# Patient Record
Sex: FEMALE | Race: WHITE | NOT HISPANIC OR LATINO | URBAN - METROPOLITAN AREA
[De-identification: names, ages, dates, MRNs, and addresses within clinical notes are randomized per-mention and may not be internally consistent; named-entity substitution may affect disease eponyms.]

---

## 2019-04-13 ENCOUNTER — LAB (OUTPATIENT)
Dept: LAB | Age: 69
End: 2019-04-13
Payer: MEDICARE

## 2019-04-13 ENCOUNTER — TRANSCRIBE ORDERS (OUTPATIENT)
Dept: ADMINISTRATIVE | Age: 69
End: 2019-04-13

## 2019-04-13 DIAGNOSIS — E55.9 VITAMIN D INSUFFICIENCY: Primary | ICD-10-CM

## 2019-04-13 DIAGNOSIS — E55.9 VITAMIN D INSUFFICIENCY: ICD-10-CM

## 2019-04-13 DIAGNOSIS — R53.83 FATIGUE, UNSPECIFIED TYPE: ICD-10-CM

## 2019-04-13 DIAGNOSIS — R53.83 FATIGUE, UNSPECIFIED TYPE: Primary | ICD-10-CM

## 2019-04-13 DIAGNOSIS — E78.5 HYPERLIPIDEMIA, UNSPECIFIED HYPERLIPIDEMIA TYPE: ICD-10-CM

## 2019-04-13 LAB
25(OH)D3 SERPL-MCNC: 61.6 NG/ML (ref 30–100)
ALBUMIN SERPL BCP-MCNC: 3.7 G/DL (ref 3.5–5)
ALP SERPL-CCNC: 65 U/L (ref 46–116)
ALT SERPL W P-5'-P-CCNC: 25 U/L (ref 12–78)
ANION GAP SERPL CALCULATED.3IONS-SCNC: 5 MMOL/L (ref 4–13)
AST SERPL W P-5'-P-CCNC: 17 U/L (ref 5–45)
BASOPHILS # BLD AUTO: 0.02 THOUSANDS/ΜL (ref 0–0.1)
BASOPHILS NFR BLD AUTO: 1 % (ref 0–1)
BILIRUB SERPL-MCNC: 0.49 MG/DL (ref 0.2–1)
BUN SERPL-MCNC: 23 MG/DL (ref 5–25)
CALCIUM SERPL-MCNC: 9.1 MG/DL (ref 8.3–10.1)
CHLORIDE SERPL-SCNC: 106 MMOL/L (ref 100–108)
CHOLEST SERPL-MCNC: 302 MG/DL (ref 50–200)
CO2 SERPL-SCNC: 31 MMOL/L (ref 21–32)
CREAT SERPL-MCNC: 0.93 MG/DL (ref 0.6–1.3)
EOSINOPHIL # BLD AUTO: 0.05 THOUSAND/ΜL (ref 0–0.61)
EOSINOPHIL NFR BLD AUTO: 2 % (ref 0–6)
ERYTHROCYTE [DISTWIDTH] IN BLOOD BY AUTOMATED COUNT: 13.1 % (ref 11.6–15.1)
GFR SERPL CREATININE-BSD FRML MDRD: 64 ML/MIN/1.73SQ M
GLUCOSE P FAST SERPL-MCNC: 90 MG/DL (ref 65–99)
HCT VFR BLD AUTO: 41.2 % (ref 34.8–46.1)
HDLC SERPL-MCNC: 78 MG/DL (ref 40–60)
HGB BLD-MCNC: 13.3 G/DL (ref 11.5–15.4)
IMM GRANULOCYTES # BLD AUTO: 0 THOUSAND/UL (ref 0–0.2)
IMM GRANULOCYTES NFR BLD AUTO: 0 % (ref 0–2)
LDLC SERPL CALC-MCNC: 210 MG/DL (ref 0–100)
LYMPHOCYTES # BLD AUTO: 1.22 THOUSANDS/ΜL (ref 0.6–4.47)
LYMPHOCYTES NFR BLD AUTO: 39 % (ref 14–44)
MCH RBC QN AUTO: 30 PG (ref 26.8–34.3)
MCHC RBC AUTO-ENTMCNC: 32.3 G/DL (ref 31.4–37.4)
MCV RBC AUTO: 93 FL (ref 82–98)
MONOCYTES # BLD AUTO: 0.27 THOUSAND/ΜL (ref 0.17–1.22)
MONOCYTES NFR BLD AUTO: 9 % (ref 4–12)
NEUTROPHILS # BLD AUTO: 1.61 THOUSANDS/ΜL (ref 1.85–7.62)
NEUTS SEG NFR BLD AUTO: 49 % (ref 43–75)
NONHDLC SERPL-MCNC: 224 MG/DL
NRBC BLD AUTO-RTO: 0 /100 WBCS
PLATELET # BLD AUTO: 272 THOUSANDS/UL (ref 149–390)
PMV BLD AUTO: 10.5 FL (ref 8.9–12.7)
POTASSIUM SERPL-SCNC: 4.1 MMOL/L (ref 3.5–5.3)
PROT SERPL-MCNC: 7.2 G/DL (ref 6.4–8.2)
RBC # BLD AUTO: 4.43 MILLION/UL (ref 3.81–5.12)
SODIUM SERPL-SCNC: 142 MMOL/L (ref 136–145)
TRIGL SERPL-MCNC: 72 MG/DL
TSH SERPL DL<=0.05 MIU/L-ACNC: 2.09 UIU/ML (ref 0.36–3.74)
WBC # BLD AUTO: 3.17 THOUSAND/UL (ref 4.31–10.16)

## 2019-04-13 PROCEDURE — 82306 VITAMIN D 25 HYDROXY: CPT

## 2019-04-13 PROCEDURE — 80053 COMPREHEN METABOLIC PANEL: CPT

## 2019-04-13 PROCEDURE — 84443 ASSAY THYROID STIM HORMONE: CPT

## 2019-04-13 PROCEDURE — 80061 LIPID PANEL: CPT

## 2019-04-13 PROCEDURE — 85025 COMPLETE CBC W/AUTO DIFF WBC: CPT

## 2019-04-13 PROCEDURE — 36415 COLL VENOUS BLD VENIPUNCTURE: CPT

## 2019-09-28 ENCOUNTER — TRANSCRIBE ORDERS (OUTPATIENT)
Dept: ADMINISTRATIVE | Age: 69
End: 2019-09-28

## 2019-09-28 ENCOUNTER — APPOINTMENT (OUTPATIENT)
Dept: LAB | Age: 69
End: 2019-09-28
Payer: MEDICARE

## 2019-09-28 DIAGNOSIS — E78.5 HYPERLIPIDEMIA, UNSPECIFIED HYPERLIPIDEMIA TYPE: Primary | ICD-10-CM

## 2019-09-28 DIAGNOSIS — E78.5 HYPERLIPIDEMIA, UNSPECIFIED HYPERLIPIDEMIA TYPE: ICD-10-CM

## 2019-09-28 LAB
ALBUMIN SERPL BCP-MCNC: 3.7 G/DL (ref 3.5–5)
ALP SERPL-CCNC: 62 U/L (ref 46–116)
ALT SERPL W P-5'-P-CCNC: 19 U/L (ref 12–78)
ANION GAP SERPL CALCULATED.3IONS-SCNC: 5 MMOL/L (ref 4–13)
AST SERPL W P-5'-P-CCNC: 16 U/L (ref 5–45)
BILIRUB SERPL-MCNC: 0.47 MG/DL (ref 0.2–1)
BUN SERPL-MCNC: 21 MG/DL (ref 5–25)
CALCIUM SERPL-MCNC: 9.4 MG/DL (ref 8.3–10.1)
CHLORIDE SERPL-SCNC: 107 MMOL/L (ref 100–108)
CO2 SERPL-SCNC: 29 MMOL/L (ref 21–32)
CREAT SERPL-MCNC: 0.99 MG/DL (ref 0.6–1.3)
GFR SERPL CREATININE-BSD FRML MDRD: 59 ML/MIN/1.73SQ M
GLUCOSE P FAST SERPL-MCNC: 94 MG/DL (ref 65–99)
LDLC SERPL DIRECT ASSAY-MCNC: 118 MG/DL (ref 0–100)
POTASSIUM SERPL-SCNC: 3.6 MMOL/L (ref 3.5–5.3)
PROT SERPL-MCNC: 7.1 G/DL (ref 6.4–8.2)
SODIUM SERPL-SCNC: 141 MMOL/L (ref 136–145)

## 2019-09-28 PROCEDURE — 80053 COMPREHEN METABOLIC PANEL: CPT

## 2019-09-28 PROCEDURE — 83721 ASSAY OF BLOOD LIPOPROTEIN: CPT

## 2019-09-28 PROCEDURE — 36415 COLL VENOUS BLD VENIPUNCTURE: CPT

## 2020-09-22 ENCOUNTER — TRANSCRIBE ORDERS (OUTPATIENT)
Dept: LAB | Facility: CLINIC | Age: 70
End: 2020-09-22

## 2020-09-22 DIAGNOSIS — Z03.818 ENCNTR FOR OBS FOR SUSP EXPSR TO OTH BIOLG AGENTS RULED OUT: Primary | ICD-10-CM

## 2020-09-22 DIAGNOSIS — Z71.84 COUNSELING FOR TRAVEL: ICD-10-CM

## 2020-09-25 DIAGNOSIS — Z03.818 ENCNTR FOR OBS FOR SUSP EXPSR TO OTH BIOLG AGENTS RULED OUT: ICD-10-CM

## 2020-09-25 DIAGNOSIS — Z71.84 COUNSELING FOR TRAVEL: ICD-10-CM

## 2020-09-25 PROCEDURE — U0003 INFECTIOUS AGENT DETECTION BY NUCLEIC ACID (DNA OR RNA); SEVERE ACUTE RESPIRATORY SYNDROME CORONAVIRUS 2 (SARS-COV-2) (CORONAVIRUS DISEASE [COVID-19]), AMPLIFIED PROBE TECHNIQUE, MAKING USE OF HIGH THROUGHPUT TECHNOLOGIES AS DESCRIBED BY CMS-2020-01-R: HCPCS | Performed by: INTERNAL MEDICINE

## 2020-09-26 LAB — SARS-COV-2 RNA SPEC QL NAA+PROBE: NOT DETECTED

## 2020-10-16 ENCOUNTER — LAB REQUISITION (OUTPATIENT)
Dept: LAB | Facility: HOSPITAL | Age: 70
End: 2020-10-16
Payer: COMMERCIAL

## 2020-10-16 DIAGNOSIS — I10 ESSENTIAL (PRIMARY) HYPERTENSION: ICD-10-CM

## 2020-10-16 DIAGNOSIS — E78.5 HYPERLIPIDEMIA, UNSPECIFIED: ICD-10-CM

## 2020-10-16 LAB
ALBUMIN SERPL BCP-MCNC: 4.5 G/DL (ref 3.5–5)
ALP SERPL-CCNC: 82 U/L (ref 46–116)
ALT SERPL W P-5'-P-CCNC: 28 U/L (ref 12–78)
ANION GAP SERPL CALCULATED.3IONS-SCNC: 6 MMOL/L (ref 4–13)
AST SERPL W P-5'-P-CCNC: 25 U/L (ref 5–45)
BILIRUB SERPL-MCNC: 0.33 MG/DL (ref 0.2–1)
BUN SERPL-MCNC: 23 MG/DL (ref 5–25)
CALCIUM SERPL-MCNC: 10.3 MG/DL (ref 8.3–10.1)
CHLORIDE SERPL-SCNC: 102 MMOL/L (ref 100–108)
CHOLEST SERPL-MCNC: 249 MG/DL (ref 50–200)
CO2 SERPL-SCNC: 33 MMOL/L (ref 21–32)
CREAT SERPL-MCNC: 0.91 MG/DL (ref 0.6–1.3)
GFR SERPL CREATININE-BSD FRML MDRD: 65 ML/MIN/1.73SQ M
GLUCOSE SERPL-MCNC: 75 MG/DL (ref 65–140)
HDLC SERPL-MCNC: 95 MG/DL
LDLC SERPL CALC-MCNC: 130 MG/DL (ref 0–100)
NONHDLC SERPL-MCNC: 154 MG/DL
POTASSIUM SERPL-SCNC: 4.2 MMOL/L (ref 3.5–5.3)
PROT SERPL-MCNC: 7.8 G/DL (ref 6.4–8.2)
SODIUM SERPL-SCNC: 141 MMOL/L (ref 136–145)
TRIGL SERPL-MCNC: 118 MG/DL

## 2020-10-16 PROCEDURE — 80061 LIPID PANEL: CPT | Performed by: INTERNAL MEDICINE

## 2020-10-16 PROCEDURE — 80053 COMPREHEN METABOLIC PANEL: CPT | Performed by: INTERNAL MEDICINE

## 2021-03-10 DIAGNOSIS — Z23 ENCOUNTER FOR IMMUNIZATION: ICD-10-CM

## 2021-08-19 ENCOUNTER — PROBLEM (OUTPATIENT)
Dept: URBAN - METROPOLITAN AREA CLINIC 6 | Facility: CLINIC | Age: 71
End: 2021-08-19

## 2021-08-19 DIAGNOSIS — H18.221: ICD-10-CM

## 2021-08-19 PROCEDURE — 92012 INTRM OPH EXAM EST PATIENT: CPT

## 2021-08-19 PROCEDURE — 76514 ECHO EXAM OF EYE THICKNESS: CPT

## 2021-08-19 ASSESSMENT — PACHYMETRY
OD_CT_UM: 722
OS_CT_UM: 592

## 2021-08-19 ASSESSMENT — VISUAL ACUITY
OS_CC: 20/30
OD_CC: 20/100

## 2021-09-30 ENCOUNTER — ESTABLISHED COMPREHENSIVE EXAM (OUTPATIENT)
Dept: URBAN - METROPOLITAN AREA CLINIC 6 | Facility: CLINIC | Age: 71
End: 2021-09-30

## 2021-09-30 DIAGNOSIS — H18.221: ICD-10-CM

## 2021-09-30 PROCEDURE — 92012 INTRM OPH EXAM EST PATIENT: CPT

## 2021-09-30 PROCEDURE — 76514 ECHO EXAM OF EYE THICKNESS: CPT

## 2021-09-30 PROCEDURE — G8428 CUR MEDS NOT DOCUMENT: HCPCS

## 2021-09-30 ASSESSMENT — VISUAL ACUITY
OD_CC: 20/70+1
OS_CC: 20/25+2

## 2021-09-30 ASSESSMENT — PACHYMETRY
OD_CT_UM: 621
OS_CT_UM: 604

## 2021-09-30 ASSESSMENT — TONOMETRY
OS_IOP_MMHG: 22
OD_IOP_MMHG: 24

## 2021-10-20 ENCOUNTER — LAB REQUISITION (OUTPATIENT)
Dept: LAB | Facility: HOSPITAL | Age: 71
End: 2021-10-20
Payer: COMMERCIAL

## 2021-10-20 DIAGNOSIS — R07.0 PAIN IN THROAT: ICD-10-CM

## 2021-10-20 PROCEDURE — 87635 SARS-COV-2 COVID-19 AMP PRB: CPT | Performed by: INTERNAL MEDICINE

## 2021-10-22 LAB — SARS-COV-2 RNA RESP QL NAA+PROBE: NEGATIVE

## 2021-11-16 ENCOUNTER — FOLLOW UP (OUTPATIENT)
Dept: URBAN - METROPOLITAN AREA CLINIC 6 | Facility: CLINIC | Age: 71
End: 2021-11-16

## 2021-11-16 DIAGNOSIS — H18.221: ICD-10-CM

## 2021-11-16 PROCEDURE — 76514 ECHO EXAM OF EYE THICKNESS: CPT

## 2021-11-16 PROCEDURE — G8427 DOCREV CUR MEDS BY ELIG CLIN: HCPCS

## 2021-11-16 PROCEDURE — 92012 INTRM OPH EXAM EST PATIENT: CPT

## 2021-11-16 ASSESSMENT — TONOMETRY
OD_IOP_MMHG: 42
OS_IOP_MMHG: 21

## 2021-11-16 ASSESSMENT — PACHYMETRY
OS_CT_UM: 605
OD_CT_UM: 618

## 2021-11-16 ASSESSMENT — VISUAL ACUITY
OD_CC: 20/40
OS_CC: 20/30

## 2021-12-28 ENCOUNTER — FOLLOW UP (OUTPATIENT)
Dept: URBAN - METROPOLITAN AREA CLINIC 6 | Facility: CLINIC | Age: 71
End: 2021-12-28

## 2021-12-28 DIAGNOSIS — H04.123: ICD-10-CM

## 2021-12-28 DIAGNOSIS — H18.221: ICD-10-CM

## 2021-12-28 PROCEDURE — 92132 CPTRZD OPH DX IMG ANT SGM: CPT

## 2021-12-28 PROCEDURE — G8427 DOCREV CUR MEDS BY ELIG CLIN: HCPCS

## 2021-12-28 PROCEDURE — 92012 INTRM OPH EXAM EST PATIENT: CPT

## 2021-12-28 ASSESSMENT — TONOMETRY
OS_IOP_MMHG: 15
OD_IOP_MMHG: 19

## 2021-12-28 ASSESSMENT — VISUAL ACUITY
OD_CC: 20/40
OS_CC: 20/30

## 2022-02-28 ENCOUNTER — FOLLOW UP (OUTPATIENT)
Dept: URBAN - METROPOLITAN AREA CLINIC 6 | Facility: CLINIC | Age: 72
End: 2022-02-28

## 2022-02-28 DIAGNOSIS — H18.221: ICD-10-CM

## 2022-02-28 DIAGNOSIS — H04.123: ICD-10-CM

## 2022-02-28 PROCEDURE — 76514 ECHO EXAM OF EYE THICKNESS: CPT

## 2022-02-28 PROCEDURE — 92012 INTRM OPH EXAM EST PATIENT: CPT

## 2022-02-28 ASSESSMENT — VISUAL ACUITY
OD_CC: 20/30-1
OU_SC: J1
OS_CC: 20/30

## 2022-02-28 ASSESSMENT — PACHYMETRY
OD_CT_UM: 585
OS_CT_UM: 594

## 2022-02-28 ASSESSMENT — TONOMETRY
OS_IOP_MMHG: 15
OD_IOP_MMHG: 15

## 2022-04-13 ENCOUNTER — APPOINTMENT (OUTPATIENT)
Dept: LAB | Age: 72
End: 2022-04-13
Payer: COMMERCIAL

## 2022-04-13 DIAGNOSIS — I10 ESSENTIAL HYPERTENSION, MALIGNANT: ICD-10-CM

## 2022-04-13 DIAGNOSIS — E55.9 AVITAMINOSIS D: ICD-10-CM

## 2022-04-13 DIAGNOSIS — E78.5 HYPERLIPIDEMIA, UNSPECIFIED HYPERLIPIDEMIA TYPE: ICD-10-CM

## 2022-04-13 LAB
25(OH)D3 SERPL-MCNC: 60.8 NG/ML (ref 30–100)
ALBUMIN SERPL BCP-MCNC: 3.9 G/DL (ref 3.5–5)
ALP SERPL-CCNC: 59 U/L (ref 46–116)
ALT SERPL W P-5'-P-CCNC: 21 U/L (ref 12–78)
ANION GAP SERPL CALCULATED.3IONS-SCNC: 3 MMOL/L (ref 4–13)
AST SERPL W P-5'-P-CCNC: 22 U/L (ref 5–45)
BASOPHILS # BLD AUTO: 0.03 THOUSANDS/ΜL (ref 0–0.1)
BASOPHILS NFR BLD AUTO: 1 % (ref 0–1)
BILIRUB SERPL-MCNC: 0.53 MG/DL (ref 0.2–1)
BUN SERPL-MCNC: 22 MG/DL (ref 5–25)
CALCIUM SERPL-MCNC: 9.6 MG/DL (ref 8.3–10.1)
CHLORIDE SERPL-SCNC: 106 MMOL/L (ref 100–108)
CHOLEST SERPL-MCNC: 220 MG/DL
CO2 SERPL-SCNC: 33 MMOL/L (ref 21–32)
CREAT SERPL-MCNC: 1.17 MG/DL (ref 0.6–1.3)
EOSINOPHIL # BLD AUTO: 0.08 THOUSAND/ΜL (ref 0–0.61)
EOSINOPHIL NFR BLD AUTO: 2 % (ref 0–6)
ERYTHROCYTE [DISTWIDTH] IN BLOOD BY AUTOMATED COUNT: 13 % (ref 11.6–15.1)
GFR SERPL CREATININE-BSD FRML MDRD: 47 ML/MIN/1.73SQ M
GLUCOSE P FAST SERPL-MCNC: 86 MG/DL (ref 65–99)
HCT VFR BLD AUTO: 42 % (ref 34.8–46.1)
HDLC SERPL-MCNC: 79 MG/DL
HGB BLD-MCNC: 13.8 G/DL (ref 11.5–15.4)
IMM GRANULOCYTES # BLD AUTO: 0.01 THOUSAND/UL (ref 0–0.2)
IMM GRANULOCYTES NFR BLD AUTO: 0 % (ref 0–2)
LDLC SERPL CALC-MCNC: 126 MG/DL (ref 0–100)
LYMPHOCYTES # BLD AUTO: 1.48 THOUSANDS/ΜL (ref 0.6–4.47)
LYMPHOCYTES NFR BLD AUTO: 39 % (ref 14–44)
MCH RBC QN AUTO: 30.9 PG (ref 26.8–34.3)
MCHC RBC AUTO-ENTMCNC: 32.9 G/DL (ref 31.4–37.4)
MCV RBC AUTO: 94 FL (ref 82–98)
MONOCYTES # BLD AUTO: 0.35 THOUSAND/ΜL (ref 0.17–1.22)
MONOCYTES NFR BLD AUTO: 9 % (ref 4–12)
NEUTROPHILS # BLD AUTO: 1.82 THOUSANDS/ΜL (ref 1.85–7.62)
NEUTS SEG NFR BLD AUTO: 49 % (ref 43–75)
NONHDLC SERPL-MCNC: 141 MG/DL
NRBC BLD AUTO-RTO: 0 /100 WBCS
PLATELET # BLD AUTO: 238 THOUSANDS/UL (ref 149–390)
PMV BLD AUTO: 10.4 FL (ref 8.9–12.7)
POTASSIUM SERPL-SCNC: 3.7 MMOL/L (ref 3.5–5.3)
PROT SERPL-MCNC: 7.1 G/DL (ref 6.4–8.2)
RBC # BLD AUTO: 4.47 MILLION/UL (ref 3.81–5.12)
SODIUM SERPL-SCNC: 142 MMOL/L (ref 136–145)
TRIGL SERPL-MCNC: 75 MG/DL
WBC # BLD AUTO: 3.77 THOUSAND/UL (ref 4.31–10.16)

## 2022-04-13 PROCEDURE — 80061 LIPID PANEL: CPT

## 2022-04-13 PROCEDURE — 80053 COMPREHEN METABOLIC PANEL: CPT

## 2022-04-13 PROCEDURE — 85025 COMPLETE CBC W/AUTO DIFF WBC: CPT

## 2022-04-13 PROCEDURE — 36415 COLL VENOUS BLD VENIPUNCTURE: CPT

## 2022-04-13 PROCEDURE — 82306 VITAMIN D 25 HYDROXY: CPT

## 2022-11-02 ENCOUNTER — FOLLOW UP (OUTPATIENT)
Dept: URBAN - METROPOLITAN AREA CLINIC 6 | Facility: CLINIC | Age: 72
End: 2022-11-02

## 2022-11-02 DIAGNOSIS — H04.123: ICD-10-CM

## 2022-11-02 DIAGNOSIS — H35.361: ICD-10-CM

## 2022-11-02 DIAGNOSIS — H25.813: ICD-10-CM

## 2022-11-02 PROCEDURE — 92015 DETERMINE REFRACTIVE STATE: CPT

## 2022-11-02 PROCEDURE — 92014 COMPRE OPH EXAM EST PT 1/>: CPT

## 2022-11-02 ASSESSMENT — TONOMETRY
OD_IOP_MMHG: 15
OS_IOP_MMHG: 15

## 2022-11-02 ASSESSMENT — VISUAL ACUITY
OS_PH: 20/40
OS_CC: 20/50
OS_CC: J1
OD_CC: J2
OS_GLARE: 20/70
OD_GLARE: 20/80
OD_CC: 20/30-1

## 2022-11-29 ENCOUNTER — HOSPITAL ENCOUNTER (OUTPATIENT)
Dept: RADIOLOGY | Age: 72
Discharge: HOME | End: 2022-11-29

## 2022-11-29 VITALS — HEIGHT: 66 IN | BODY MASS INDEX: 23.3 KG/M2 | WEIGHT: 145 LBS

## 2022-11-29 DIAGNOSIS — Z12.31 ENCOUNTER FOR SCREENING MAMMOGRAM FOR MALIGNANT NEOPLASM OF BREAST: ICD-10-CM

## 2023-02-07 ENCOUNTER — APPOINTMENT (OUTPATIENT)
Dept: RADIOLOGY | Age: 73
End: 2023-02-07

## 2023-02-07 DIAGNOSIS — J18.9 UNRESOLVED PNEUMONIA: ICD-10-CM

## 2023-04-22 ENCOUNTER — APPOINTMENT (OUTPATIENT)
Dept: LAB | Age: 73
End: 2023-04-22

## 2023-04-22 DIAGNOSIS — E78.5 HYPERLIPIDEMIA, UNSPECIFIED HYPERLIPIDEMIA TYPE: ICD-10-CM

## 2023-04-22 LAB — LDLC SERPL DIRECT ASSAY-MCNC: 131 MG/DL (ref 0–100)

## 2023-10-23 ENCOUNTER — APPOINTMENT (OUTPATIENT)
Dept: LAB | Age: 73
End: 2023-10-23
Payer: COMMERCIAL

## 2023-10-23 DIAGNOSIS — E78.49 OTHER HYPERLIPIDEMIA: ICD-10-CM

## 2023-10-23 LAB
ALBUMIN SERPL BCP-MCNC: 4.3 G/DL (ref 3.5–5)
ALP SERPL-CCNC: 61 U/L (ref 34–104)
ALT SERPL W P-5'-P-CCNC: 18 U/L (ref 7–52)
ANION GAP SERPL CALCULATED.3IONS-SCNC: 5 MMOL/L
AST SERPL W P-5'-P-CCNC: 24 U/L (ref 13–39)
BILIRUB SERPL-MCNC: 0.43 MG/DL (ref 0.2–1)
BUN SERPL-MCNC: 18 MG/DL (ref 5–25)
CALCIUM SERPL-MCNC: 9.4 MG/DL (ref 8.4–10.2)
CHLORIDE SERPL-SCNC: 103 MMOL/L (ref 96–108)
CO2 SERPL-SCNC: 34 MMOL/L (ref 21–32)
CREAT SERPL-MCNC: 1.02 MG/DL (ref 0.6–1.3)
GFR SERPL CREATININE-BSD FRML MDRD: 55 ML/MIN/1.73SQ M
GLUCOSE P FAST SERPL-MCNC: 99 MG/DL (ref 65–99)
LDLC SERPL DIRECT ASSAY-MCNC: 80 MG/DL (ref 0–100)
POTASSIUM SERPL-SCNC: 3.8 MMOL/L (ref 3.5–5.3)
PROT SERPL-MCNC: 7.2 G/DL (ref 6.4–8.4)
SODIUM SERPL-SCNC: 142 MMOL/L (ref 135–147)

## 2023-10-23 PROCEDURE — 80053 COMPREHEN METABOLIC PANEL: CPT

## 2023-10-23 PROCEDURE — 83721 ASSAY OF BLOOD LIPOPROTEIN: CPT

## 2023-10-23 PROCEDURE — 36415 COLL VENOUS BLD VENIPUNCTURE: CPT

## 2023-10-30 ENCOUNTER — HOSPITAL ENCOUNTER (OUTPATIENT)
Dept: RADIOLOGY | Facility: HOSPITAL | Age: 73
Discharge: HOME | End: 2023-10-30
Payer: COMMERCIAL

## 2023-10-30 DIAGNOSIS — G89.29 OTHER CHRONIC PAIN: ICD-10-CM

## 2023-10-30 PROCEDURE — 73030 X-RAY EXAM OF SHOULDER: CPT

## 2023-11-13 ENCOUNTER — OFFICE VISIT (OUTPATIENT)
Dept: OBGYN CLINIC | Facility: OTHER | Age: 73
End: 2023-11-13
Payer: COMMERCIAL

## 2023-11-13 VITALS
SYSTOLIC BLOOD PRESSURE: 130 MMHG | BODY MASS INDEX: 24.59 KG/M2 | HEART RATE: 75 BPM | DIASTOLIC BLOOD PRESSURE: 77 MMHG | HEIGHT: 66 IN | WEIGHT: 153 LBS

## 2023-11-13 DIAGNOSIS — M12.811 ROTATOR CUFF TEAR ARTHROPATHY OF RIGHT SHOULDER: Primary | ICD-10-CM

## 2023-11-13 DIAGNOSIS — M75.101 ROTATOR CUFF TEAR ARTHROPATHY OF RIGHT SHOULDER: Primary | ICD-10-CM

## 2023-11-13 PROCEDURE — 20610 DRAIN/INJ JOINT/BURSA W/O US: CPT | Performed by: ORTHOPAEDIC SURGERY

## 2023-11-13 PROCEDURE — 99204 OFFICE O/P NEW MOD 45 MIN: CPT | Performed by: ORTHOPAEDIC SURGERY

## 2023-11-13 RX ORDER — CHLORTHALIDONE 25 MG/1
TABLET ORAL
COMMUNITY

## 2023-11-13 RX ORDER — LOVASTATIN 20 MG/1
TABLET ORAL
COMMUNITY

## 2023-11-13 RX ORDER — BETAMETHASONE SODIUM PHOSPHATE AND BETAMETHASONE ACETATE 3; 3 MG/ML; MG/ML
6 INJECTION, SUSPENSION INTRA-ARTICULAR; INTRALESIONAL; INTRAMUSCULAR; SOFT TISSUE
Status: COMPLETED | OUTPATIENT
Start: 2023-11-13 | End: 2023-11-13

## 2023-11-13 RX ORDER — FLUTICASONE PROPIONATE 50 MCG
SPRAY, SUSPENSION (ML) NASAL
COMMUNITY
Start: 2011-10-31

## 2023-11-13 RX ORDER — BUPIVACAINE HYDROCHLORIDE 2.5 MG/ML
2 INJECTION, SOLUTION INFILTRATION; PERINEURAL
Status: COMPLETED | OUTPATIENT
Start: 2023-11-13 | End: 2023-11-13

## 2023-11-13 RX ORDER — EZETIMIBE 10 MG/1
TABLET ORAL
COMMUNITY
Start: 2023-04-27

## 2023-11-13 RX ADMIN — BETAMETHASONE SODIUM PHOSPHATE AND BETAMETHASONE ACETATE 6 MG: 3; 3 INJECTION, SUSPENSION INTRA-ARTICULAR; INTRALESIONAL; INTRAMUSCULAR; SOFT TISSUE at 14:15

## 2023-11-13 RX ADMIN — BUPIVACAINE HYDROCHLORIDE 2 ML: 2.5 INJECTION, SOLUTION INFILTRATION; PERINEURAL at 14:15

## 2023-11-13 NOTE — PROGRESS NOTES
Assessment  Diagnoses and all orders for this visit:    Rotator cuff tear arthropathy of right shoulder      Discussion and Plan:    Explained to the patient that her x rays and examination are consistent with rotator cuff tear arthropathy (glenohumeral osteoarthritis with rotator cuff deficiency). The pathoanatomy and natural history of this diagnosis was explained to the patient today in the office. She understood and all questions were answered. She was provided with a cortisone injection today in the office. This is documented appropriately below. We can repeat these injections once every 4-6 months as needed  A referral to formal physical therapy/HEP was also provided for the patient today  If symptoms persist then a surgical procedure in the form of a reverse total shoulder arthroplasty would be warranted  She will follow up on an as needed basis    Subjective:   Patient ID: Princess Booker is a 68 y.o. female    The patient presents with a chief complaint of right shoulder pain. The pain began a few year(s) ago and is not associated with an acute injury. The patient describes the pain as aching and dull in intensity,  intermittent, occurring with increasing frequency in timing, and localizes the pain to the  right subacromial joint, deltoid. The pain is worse with overhead work, overuse, and raising arm over head and relieved by rest, ice, avoiding the painful activities. The pain is not associated with numbness and tingling. The pain is not associated with constitutional symptoms. The patient is awoken at night by the pain. The patient has had no prior treatment.         The following portions of the patient's history were reviewed and updated as appropriate: allergies, current medications, past family history, past medical history, past social history, past surgical history and problem list.    Objective:  /77 (BP Location: Left arm, Patient Position: Sitting, Cuff Size: Standard) Pulse 75   Ht 5' 6" (1.676 m)   Wt 69.4 kg (153 lb)   BMI 24.69 kg/m²       Right Shoulder Exam     Range of Motion   The patient has normal right shoulder ROM. Muscle Strength   Abduction: 4/5   External rotation: 4/5     Other   Erythema: absent  Sensation: normal  Pulse: present            Physical Exam  Constitutional:       Appearance: She is well-developed. Eyes:      Pupils: Pupils are equal, round, and reactive to light. Pulmonary:      Effort: Pulmonary effort is normal.      Breath sounds: Normal breath sounds. Skin:     General: Skin is warm and dry. Neurological:      Mental Status: She is alert and oriented to person, place, and time. Psychiatric:         Behavior: Behavior normal.         Thought Content: Thought content normal.         Judgment: Judgment normal.       Large joint arthrocentesis: R subacromial bursa  Universal Protocol:  Consent: Verbal consent obtained. Risks and benefits: risks, benefits and alternatives were discussed  Consent given by: patient  Time out: Immediately prior to procedure a "time out" was called to verify the correct patient, procedure, equipment, support staff and site/side marked as required. Site marked: the operative site was marked  Radiology Images displayed and confirmed.  If images not available, report reviewed: imaging studies available  Patient identity confirmed: verbally with patient  Supporting Documentation  Indications: pain and diagnostic evaluation   Procedure Details  Location: shoulder - R subacromial bursa  Preparation: Patient was prepped and draped in the usual sterile fashion  Needle size: 22 G  Ultrasound guidance: no  Approach: lateral  Medications administered: 2 mL bupivacaine 0.25 %; 6 mg betamethasone acetate-betamethasone sodium phosphate 6 (3-3) mg/mL    Patient tolerance: patient tolerated the procedure well with no immediate complications  Dressing:  Sterile dressing applied        I have personally reviewed pertinent films in PACS and my interpretation is as follows. X Ray Right Shoulder 10/30/23: High riding humeral head in relation to the glenoid suggestive of a chronic rotator cuff tear with moderate glenohumeral joint osteoarthritis.  No other acute osseous abnormalities    Scribe Attestation      I,:  Yasmine Knapp am acting as a scribe while in the presence of the attending physician.:       I,:  Jacquelin Matthews MD personally performed the services described in this documentation    as scribed in my presence.:

## 2023-11-13 NOTE — PATIENT INSTRUCTIONS
CORTICOSTEROID INJECTION  What is a corticosteroid? Injuries or disease such as arthritis, bursitis or tendonitis result in inflammation. In turn, this inflammation can cause swelling and pain. A local injection of a corticosteroid is provided to diminish inflammation. By doing so, it will also decrease pain and swelling which is making you uncomfortable. Is this the same thing as a Cortisone Injection? Cortisone® is a brand name of a corticosteroid used commonly in the past.  Today I commonly use a more water-soluble corticosteroid named Celestone®. Will the injection hurt? As with any injection, you may feel pain at the time of the injection. Typically, I use a local anesthetic (Rasta Fabry) in addition to the corticosteroid to determine if the injection has been placed in the appropriate location. Hence it is important to monitor your symptoms 4-6 hours after the injection, as the area will be anesthetized (numb) while the local anesthetic is working. Once the local anesthetic wears off, the intensity of pain can be the same as it was prior to the injection, or even worse. This does not mean that the injection is not working. The corticosteroid may take 24-72 hours to begin having a positive effect. If you do experience an increase in pain, the use of an ice pack on the area for 20 minutes at a time should help. It is also helpful to take an oral anti-inflammatory such as Tylenol® or Motrin® if you are able to medically do so. For this reason it is best to avoid activities that put stress on the area the first 24 hours after the injection. How long will pain relief last?  This will vary according to the type and severity of the symptoms being treated and the severity of the condition. Symptom relief may last weeks to months. I typically couple injections with physical therapy so that the underlying problem causing the inflammation may be treated as the pain diminishes.   If the combination is not successful, you may be a surgical candidate. I have read bad things about steroids. Will these things happen to me? Corticosteroids, when utilized properly, are safe and effective drugs. When used in a low dose, potential adverse reactions are very rare. Some patients may experience a sensation of flushing for several days. Very rarely, there can be a local reaction which may include increased discomfort for a period of time in the areas that has been injected. A steroid should not be used over and over again. Multiple injections in the same area can produce adverse effects such as tissue atrophy and degeneration of tendon or cartilage. A small percentage of patients (< 0.1%) may develop an infection in the joint after injection. This is a treatable problem, but if neglected, may result in permanent disability. Signs of infection include redness, swelling, discharge, fevers, increasing pain and drainage from the injection site. This represents an emergency and you should contact our office immediately or seek treatment in the ER if after hours. If I have diabetes, will this injection affect me? If you are diabetic, an injection of a corticosteroid can raise your blood sugar level, requiring more insulin for a brief period of time. This may necessitate careful blood sugar maintenance. If the elevated sugars are not able to be controlled, contact your diabetic doctor for guidance.

## 2023-11-14 ENCOUNTER — ESTABLISHED COMPREHENSIVE EXAM (OUTPATIENT)
Dept: URBAN - METROPOLITAN AREA CLINIC 6 | Facility: CLINIC | Age: 73
End: 2023-11-14

## 2023-11-14 DIAGNOSIS — H04.123: ICD-10-CM

## 2023-11-14 DIAGNOSIS — H35.363: ICD-10-CM

## 2023-11-14 DIAGNOSIS — H25.813: ICD-10-CM

## 2023-11-14 DIAGNOSIS — H31.103: ICD-10-CM

## 2023-11-14 PROCEDURE — 92134 CPTRZ OPH DX IMG PST SGM RTA: CPT

## 2023-11-14 PROCEDURE — 92014 COMPRE OPH EXAM EST PT 1/>: CPT

## 2023-11-14 ASSESSMENT — VISUAL ACUITY
OD_CC: 20/40
OD_PH: 20/30-2
OS_CC: 20/40
OS_PH: 20/30-2

## 2023-11-14 ASSESSMENT — TONOMETRY
OS_IOP_MMHG: 14
OD_IOP_MMHG: 16

## 2023-11-27 ENCOUNTER — EVALUATION (OUTPATIENT)
Dept: PHYSICAL THERAPY | Age: 73
End: 2023-11-27
Payer: COMMERCIAL

## 2023-11-27 DIAGNOSIS — M12.811 ROTATOR CUFF TEAR ARTHROPATHY OF RIGHT SHOULDER: Primary | ICD-10-CM

## 2023-11-27 DIAGNOSIS — M75.101 ROTATOR CUFF TEAR ARTHROPATHY OF RIGHT SHOULDER: Primary | ICD-10-CM

## 2023-11-27 PROCEDURE — 97110 THERAPEUTIC EXERCISES: CPT | Performed by: PHYSICAL THERAPIST

## 2023-11-27 PROCEDURE — 97140 MANUAL THERAPY 1/> REGIONS: CPT | Performed by: PHYSICAL THERAPIST

## 2023-11-27 PROCEDURE — 97161 PT EVAL LOW COMPLEX 20 MIN: CPT | Performed by: PHYSICAL THERAPIST

## 2023-11-27 NOTE — PROGRESS NOTES
PT Evaluation     Today's date: 2023  Patient name: Juan Carlos Perez  : 1950  MRN: [id number]  Referring provider: Vero Graza*  Dx:   Encounter Diagnosis     ICD-10-CM    1. Rotator cuff tear arthropathy of right shoulder  M75.101 Ambulatory Referral to Physical Therapy    M12.811                      Assessment  Assessment details: Pt reports to PT with cc of R shoulder pain. Pt has symptoms consistent with rotator cuff pathology and would benefit from skilled PT in order to improve function with ADLs.    Impairments: abnormal or restricted ROM, abnormal movement, activity intolerance, impaired physical strength, lacks appropriate home exercise program and pain with function    Goals  In 4 weeks pt will:  -Be independent with phase I of HEP  -Increase UE strength by 1/2 grade  -Increase UE ROM by 10 degrees    By discharge pt will:  -Be independent with Phase II of HEP  -Demonstrate full UE strength  -Demonstrate full UE ROM  -Report minimal pain with ADLs    Plan  Patient would benefit from: skilled physical therapy  Planned therapy interventions: joint mobilization, neuromuscular re-education, manual therapy, patient education, therapeutic activities, therapeutic exercise, home exercise program and functional ROM exercises  Frequency: 2x week  Plan of Care beginning date: 2023  Plan of Care expiration date: 2024  Treatment plan discussed with: PTA and patient      Subjective    Objective     Active Range of Motion   Left Shoulder   Flexion: 170 degrees     Right Shoulder   Flexion: 160 degrees   External rotation 45°: 30 degrees with pain  Internal rotation 45°: WFL and with pain    Strength/Myotome Testing     Left Shoulder     Planes of Motion   Flexion: 4   External rotation at 45°: 3+   Internal rotation at 45°: 3+     Right Shoulder     Planes of Motion   Flexion: 4   External rotation at 45°: 3   Internal rotation at 45°: 3              Precautions: N/A      Manuals  PROM [facility]RIS            [facility] mob[facility] JR                                      Neuro Re-Ed                                                                                                        Ther Ex             Prone row/ext, S/L ER, pec stretch 25'                                                                                                       Ther Activity                                       Gait Training                                       Modalities

## 2023-12-05 ENCOUNTER — OFFICE VISIT (OUTPATIENT)
Dept: PHYSICAL THERAPY | Age: 73
End: 2023-12-05
Payer: COMMERCIAL

## 2023-12-05 DIAGNOSIS — M12.811 ROTATOR CUFF TEAR ARTHROPATHY OF RIGHT SHOULDER: Primary | ICD-10-CM

## 2023-12-05 DIAGNOSIS — M75.101 ROTATOR CUFF TEAR ARTHROPATHY OF RIGHT SHOULDER: Primary | ICD-10-CM

## 2023-12-05 PROCEDURE — 97110 THERAPEUTIC EXERCISES: CPT | Performed by: PHYSICAL THERAPIST

## 2023-12-05 NOTE — PROGRESS NOTES
Daily Note     Today's date: 2023  Patient name: Fly Steen  : 1950  MRN: [id number]  Referring provider: Liana Rosales*  Dx:   Encounter Diagnosis     ICD-10-CM    1. Rotator cuff tear arthropathy of right shoulder  M75.101     M12.811                      Subjective: Pt has some soreness at the moment       Objective: See treatment diary below      Assessment: Tolerated treatment well. Patient demonstrated fatigue post treatment, would benefit from continued PT, and pt will continue with HEP, with goal of increases reps before next visit. Capsular restrictions remain      Plan: Continue per plan of care. Progress treatment as tolerated.        Precautions: N/A      Manuals            PROM JR NORRIS           [facility] mobs JR NORRIS                                     Neuro Re-Ed                                                                                                        Ther Ex             Prone row/ext, S/L ER, pec stretch 25'            Prone row/ext  1# 2x15           S/L er  3x15                                                                            Ther Activity                                       Gait Training                                       Modalities

## 2023-12-12 ENCOUNTER — OFFICE VISIT (OUTPATIENT)
Dept: PHYSICAL THERAPY | Age: 73
End: 2023-12-12
Payer: COMMERCIAL

## 2023-12-12 ENCOUNTER — FOLLOW UP (OUTPATIENT)
Dept: URBAN - METROPOLITAN AREA CLINIC 6 | Facility: CLINIC | Age: 73
End: 2023-12-12

## 2023-12-12 DIAGNOSIS — H52.4: ICD-10-CM

## 2023-12-12 DIAGNOSIS — M12.811 ROTATOR CUFF TEAR ARTHROPATHY OF RIGHT SHOULDER: Primary | ICD-10-CM

## 2023-12-12 DIAGNOSIS — M75.101 ROTATOR CUFF TEAR ARTHROPATHY OF RIGHT SHOULDER: Primary | ICD-10-CM

## 2023-12-12 PROCEDURE — 97110 THERAPEUTIC EXERCISES: CPT | Performed by: PHYSICAL THERAPIST

## 2023-12-12 PROCEDURE — 92015 DETERMINE REFRACTIVE STATE: CPT

## 2023-12-12 PROCEDURE — 97140 MANUAL THERAPY 1/> REGIONS: CPT | Performed by: PHYSICAL THERAPIST

## 2023-12-12 ASSESSMENT — KERATOMETRY
OS_AXISANGLE2_DEGREES: 161
OS_K2POWER_DIOPTERS: 45.00
OS_AXISANGLE_DEGREES: 071
OD_K1POWER_DIOPTERS: 44.75
OD_AXISANGLE2_DEGREES: 90
OD_AXISANGLE_DEGREES: 180
OD_K2POWER_DIOPTERS: 44.75
OS_K1POWER_DIOPTERS: 44.75

## 2023-12-12 ASSESSMENT — VISUAL ACUITY
OS_CC: J1
OD_CC: 20/40-1
OS_PH: 20/40
OS_CC: 20/50+1
OD_CC: J1

## 2023-12-12 NOTE — PROGRESS NOTES
Daily Note     Today's date: 2023  Patient name: Jose Hicks  : 1950  MRN: [id number]  Referring provider: Asif Jacome*  Dx:   Encounter Diagnosis     ICD-10-CM    1. Rotator cuff tear arthropathy of right shoulder  M75.101     M12.811                      Subjective: Pt reports continued improvement       Objective: See treatment diary below      Assessment: Tolerated treatment well. Patient demonstrated fatigue post treatment, would benefit from continued PT, and pt continues to progress with strengthening and capsular mobility       Plan: Continue per plan of care. Progress treatment as tolerated.        Precautions: N/A      Manuals           PROM JR NORRIS          [facility] mobs JR NORRIS                                    Neuro Re-Ed                                                                                                                     Ther Ex             Prone row/ext, S/L ER, pec stretch 25'            Arm bike             Prone row/ext  1# 2x15 2# 3x15          S/L er  3x15 3x15 full ROM          SA punch   3x15 2#                                                              Ther Activity                                       Gait Training                                       Modalities

## 2023-12-19 ENCOUNTER — OFFICE VISIT (OUTPATIENT)
Dept: PHYSICAL THERAPY | Age: 73
End: 2023-12-19
Payer: COMMERCIAL

## 2023-12-19 DIAGNOSIS — M75.101 ROTATOR CUFF TEAR ARTHROPATHY OF RIGHT SHOULDER: Primary | ICD-10-CM

## 2023-12-19 DIAGNOSIS — M12.811 ROTATOR CUFF TEAR ARTHROPATHY OF RIGHT SHOULDER: Primary | ICD-10-CM

## 2023-12-19 PROCEDURE — 97110 THERAPEUTIC EXERCISES: CPT | Performed by: PHYSICAL THERAPIST

## 2023-12-19 NOTE — PROGRESS NOTES
Daily Note     Today's date: 2023  Patient name: Liana Dsouza  : 1950  MRN: [id number]  Referring provider: Brent Alvarado*  Dx:   Encounter Diagnosis     ICD-10-CM    1. Rotator cuff tear arthropathy of right shoulder  M75.101     M12.811                      Subjective: Pt has no complaints       Objective: See treatment diary below      Assessment: Tolerated treatment well. Patient demonstrated fatigue post treatment, would benefit from continued PT, and Pt was given updated HEP      Plan: Continue per plan of care.  Progress treatment as tolerated.       Precautions: N/A      Manuals          PROM [facility]         [facility] mobs [facility]                                   Neuro Re-Ed                                                                                                                     Ther Ex             Prone row/ext, S/L ER, pec stretch 25'            Arm bike            Prone row/ext  1# 2x15 2# 3x15 2# 3x15         S/L er  3x15 3x15 full ROM 1# 2x15         SA punch   3x15 2#          S/L flex    3x15         HEP    S/L flex, wall push up                                   Ther Activity                                       Gait Training                                       Modalities

## 2023-12-26 ENCOUNTER — OFFICE VISIT (OUTPATIENT)
Dept: PHYSICAL THERAPY | Age: 73
End: 2023-12-26
Payer: COMMERCIAL

## 2023-12-26 DIAGNOSIS — M12.811 ROTATOR CUFF TEAR ARTHROPATHY OF RIGHT SHOULDER: Primary | ICD-10-CM

## 2023-12-26 DIAGNOSIS — M75.101 ROTATOR CUFF TEAR ARTHROPATHY OF RIGHT SHOULDER: Primary | ICD-10-CM

## 2023-12-26 PROCEDURE — 97140 MANUAL THERAPY 1/> REGIONS: CPT | Performed by: PHYSICAL THERAPIST

## 2023-12-26 PROCEDURE — 97110 THERAPEUTIC EXERCISES: CPT | Performed by: PHYSICAL THERAPIST

## 2023-12-26 NOTE — PROGRESS NOTES
Daily Note     Today's date: 2023  Patient name: Liana Dsouza  : 1950  MRN: [id number]  Referring provider: Brent Alvarado*  Dx:   Encounter Diagnosis     ICD-10-CM    1. Rotator cuff tear arthropathy of right shoulder  M75.101     M12.811                      Subjective: No major changes       Objective: See treatment diary below      Assessment: Tolerated treatment well. Patient demonstrated fatigue post treatment, would benefit from continued PT, and pt remains limited in ER strength/ROM      Plan: Continue per plan of care.  Progress treatment as tolerated.       Precautions: N/A      Manuals         PROM [facility] JR NORRIS NORRIS        [facility] mobs [facility] NORRIS NORRIS                                  Neuro Re-Ed                                                                                                                     Ther Ex             Prone row/ext, S/L ER, pec stretch 25'            Arm bike           Prone row/ext  1# 2x15 2# 3x15 2# 3x15 2# 3x15        S/L er  3x15 3x15 full ROM 1# 2x15 1# 2x15        SA punch   3x15 2#  3x15 2#        S/L flex    3x15         HEP    S/L flex, wall push up               Give TB for row/ext                    Ther Activity                                       Gait Training                                       Modalities

## 2024-01-02 ENCOUNTER — OFFICE VISIT (OUTPATIENT)
Dept: PHYSICAL THERAPY | Age: 74
End: 2024-01-02
Payer: COMMERCIAL

## 2024-01-02 DIAGNOSIS — M12.811 ROTATOR CUFF TEAR ARTHROPATHY OF RIGHT SHOULDER: Primary | ICD-10-CM

## 2024-01-02 DIAGNOSIS — M75.101 ROTATOR CUFF TEAR ARTHROPATHY OF RIGHT SHOULDER: Primary | ICD-10-CM

## 2024-01-02 PROCEDURE — 97140 MANUAL THERAPY 1/> REGIONS: CPT | Performed by: SPECIALIST/TECHNOLOGIST

## 2024-01-02 PROCEDURE — 97110 THERAPEUTIC EXERCISES: CPT | Performed by: SPECIALIST/TECHNOLOGIST

## 2024-01-02 NOTE — PROGRESS NOTES
Daily Note     Today's date: 2024  Patient name: Liana Dsouza  : 1950  MRN: [id number]  Referring provider: Brnet Alvarado*  Dx:   Encounter Diagnosis     ICD-10-CM    1. Rotator cuff tear arthropathy of right shoulder  M75.101     M12.811                      Subjective: Pt reports she has not been performing HEP as frequently due to the holidays.       Objective: See treatment diary below      Assessment: Pt able to perform increased reps and resistance this visit without reproduction of R shoulder pain. Updated HEP with resistance bands. Improved pain free ROM noted following manual interventions. Tolerated treatment well. Patient demonstrated fatigue post treatment, exhibited good technique with therapeutic exercises, and would benefit from continued PT      Plan: Continue per plan of care.  Progress treatment as tolerated.       Precautions: N/A        Manuals        PROM JR NORRIS KM       [facility] mobs JR NORRIS                                  Neuro Re-Ed                                                                                                                     Ther Ex             Prone row/ext, S/L ER, pec stretch 25'            Arm bike          Prone row/ext  1# 2x15 2# 3x15 2# 3x15 2# 3x15 3#, 2# 3x15       S/L er  3x15 3x15 full ROM 1# 2x15 1# 2x15 2# 3x10       SA punch   3x15 2#  3x15 2# 3# 3x15       S/L flex    3x15  3x15        HEP    S/L flex, wall push up  Rows & Ext Green TB 3x10, IR 2x10                                 Ther Activity                                       Gait Training                                       Modalities

## 2024-01-09 ENCOUNTER — OFFICE VISIT (OUTPATIENT)
Dept: PHYSICAL THERAPY | Age: 74
End: 2024-01-09
Payer: COMMERCIAL

## 2024-01-09 DIAGNOSIS — M12.811 ROTATOR CUFF TEAR ARTHROPATHY OF RIGHT SHOULDER: Primary | ICD-10-CM

## 2024-01-09 DIAGNOSIS — M75.101 ROTATOR CUFF TEAR ARTHROPATHY OF RIGHT SHOULDER: Primary | ICD-10-CM

## 2024-01-09 PROCEDURE — 97110 THERAPEUTIC EXERCISES: CPT | Performed by: PHYSICAL THERAPIST

## 2024-01-09 PROCEDURE — 97140 MANUAL THERAPY 1/> REGIONS: CPT | Performed by: PHYSICAL THERAPIST

## 2024-01-09 NOTE — PROGRESS NOTES
Daily Note     Today's date: 2024  Patient name: Liana Dsouza  : 1950  MRN: [id number]  Referring provider: Brent Alvarado*  Dx:   Encounter Diagnosis     ICD-10-CM    1. Rotator cuff tear arthropathy of right shoulder  M75.101     M12.811                      Subjective: Pt reports continued improvement, has difficulty with lifting items onto shelves       Objective: See treatment diary below      Assessment: Tolerated treatment well. Patient demonstrated fatigue post treatment, would benefit from continued PT, and pt will add in std flex/scaption       Plan: Continue per plan of care.  Progress treatment as tolerated.       Precautions: N/A        Manuals       PROM [facility] NORRIS      [facility] mobs [facility]                                  Neuro Re-Ed                                                                                                                     Ther Ex             Prone row/ext, S/L ER, pec stretch 25'            Arm bike   5      Prone row/ext  1# 2x15 2# 3x15 2# 3x15 2# 3x15 3#, 2# 3x15 DC      S/L er  3x15 3x15 full ROM 1# 2x15 1# 2x15 2# 3x10       SA punch   3x15 2#  3x15 2# 3# 3x15       S/L flex    3x15  3x15        HEP    S/L flex, wall push up  Rows & Ext Green TB 3x10, IR 2x10       Std flex/abd       1# 2x10                    Ther Activity                                       Gait Training                                       Modalities

## 2024-01-16 ENCOUNTER — APPOINTMENT (OUTPATIENT)
Dept: PHYSICAL THERAPY | Age: 74
End: 2024-01-16
Payer: COMMERCIAL

## 2024-01-17 ENCOUNTER — OFFICE VISIT (OUTPATIENT)
Dept: PHYSICAL THERAPY | Age: 74
End: 2024-01-17
Payer: COMMERCIAL

## 2024-01-17 DIAGNOSIS — M75.101 ROTATOR CUFF TEAR ARTHROPATHY OF RIGHT SHOULDER: Primary | ICD-10-CM

## 2024-01-17 DIAGNOSIS — M12.811 ROTATOR CUFF TEAR ARTHROPATHY OF RIGHT SHOULDER: Primary | ICD-10-CM

## 2024-01-17 PROCEDURE — 97140 MANUAL THERAPY 1/> REGIONS: CPT | Performed by: SPECIALIST/TECHNOLOGIST

## 2024-01-17 PROCEDURE — 97110 THERAPEUTIC EXERCISES: CPT | Performed by: SPECIALIST/TECHNOLOGIST

## 2024-01-17 NOTE — PROGRESS NOTES
Daily Note     Today's date: 2024  Patient name: Liana Dsouza  : 1950  MRN: [id number]  Referring provider: Brent Alvarado*  Dx:   Encounter Diagnosis     ICD-10-CM    1. Rotator cuff tear arthropathy of right shoulder  M75.101     M12.811                      Subjective: Pt reports ongoing compliance with HEP and she has stopped taking OTC meds to manage shoulder pain.       Objective: See treatment diary below      Assessment: Pt able to increase resistance and reps with several therex without reproduction of R shoulder pain.  Tolerated treatment well. Patient demonstrated fatigue post treatment, exhibited good technique with therapeutic exercises, and would benefit from continued PT      Plan: Continue per plan of care.  Progress treatment as tolerated.       Precautions: N/A        Manuals      PROM [facility]     [facility] mobs [facility]                                  Neuro Re-Ed                                                                                                                     Ther Ex             Prone row/ext, S/L ER, pec stretch 25'            Arm bike   5 5     Prone row/ext  1# 2x15 2# 3x15 2# 3x15 2# 3x15 3#, 2# 3x15 DC      Prone Horiz Abd        2x10     S/L er  3x15 3x15 full ROM 1# 2x15 1# 2x15 2# 3x10 2# 3x10 2# 3x10     SA punch   3x15 2#  3x15 2# 3# 3x15 3# 3x15 3# 3x15     S/L flex    3x15  3x15   1# 3x10     HEP    S/L flex, wall push up  Rows & Ext Green TB 3x10, IR 2x10  Blue tB given     TB IR        Yellow 2x10     Std flex/abd       1# 2x10  2# 3x10 ea     Rows, Ext [place]        16#, 8# 3x10 ea     Ther Activity                                       Gait Training                                       Modalities

## 2024-01-23 ENCOUNTER — OFFICE VISIT (OUTPATIENT)
Dept: PHYSICAL THERAPY | Age: 74
End: 2024-01-23
Payer: COMMERCIAL

## 2024-01-23 DIAGNOSIS — M75.101 ROTATOR CUFF TEAR ARTHROPATHY OF RIGHT SHOULDER: Primary | ICD-10-CM

## 2024-01-23 DIAGNOSIS — M12.811 ROTATOR CUFF TEAR ARTHROPATHY OF RIGHT SHOULDER: Primary | ICD-10-CM

## 2024-01-23 PROCEDURE — 97110 THERAPEUTIC EXERCISES: CPT | Performed by: SPECIALIST/TECHNOLOGIST

## 2024-01-23 PROCEDURE — 97140 MANUAL THERAPY 1/> REGIONS: CPT | Performed by: SPECIALIST/TECHNOLOGIST

## 2024-01-23 NOTE — PROGRESS NOTES
Daily Note     Today's date: 2024  Patient name: Liana Dsouza  : 1950  MRN: [id number]  Referring provider: Brent Alvarado*  Dx:   Encounter Diagnosis     ICD-10-CM    1. Rotator cuff tear arthropathy of right shoulder  M75.101     M12.811                      Subjective: Pt reports consistent compliance with HEP.       Objective: See treatment diary below      Assessment: Pt's strength is progressing well in all planes without exacerbation of R shoulder pain. Tolerated treatment well. Patient demonstrated fatigue post treatment, exhibited good technique with therapeutic exercises, and would benefit from continued PT      Plan: Continue per plan of care.  Progress treatment as tolerated.       Precautions: N/A        Manuals     PROM [facility]    [facility] mobs [facility]                                  Neuro Re-Ed                                                                                                                     Ther Ex             Prone row/ext, S/L ER, pec stretch 25'            Arm bike   /4 4/4 4/4 5/5 5/5 5/5 5/5    Prone row/ext  1# 2x15 2# 3x15 2# 3x15 2# 3x15 3#, 2# 3x15 DC      Prone Horiz Abd        2x10 3x10    S/L er  3x15 3x15 full ROM 1# 2x15 1# 2x15 2# 3x10 2# 3x10 2# 3x10 2# 3x10    SA punch   3x15 2#  3x15 2# 3# 3x15 3# 3x15 3# 3x15 5# 3x10    S/L flex    3x15  3x15   1# 3x10 2# 3x10    HEP    S/L flex, wall push up  Rows & Ext Green TB 3x10, IR 2x10  Blue tB given     TB IR        Yellow 2x10 3# 3x10    Std flex/abd       1# 2x10  2# 3x10 ea 2# 3x10 ea    Rows, Ext [place]        16#, 8# 3x10 ea 18#, 9# 3x10    Ther Activity                                       Gait Training                                       Modalities

## 2024-01-30 ENCOUNTER — OFFICE VISIT (OUTPATIENT)
Dept: PHYSICAL THERAPY | Age: 74
End: 2024-01-30
Payer: COMMERCIAL

## 2024-01-30 DIAGNOSIS — M75.101 ROTATOR CUFF TEAR ARTHROPATHY OF RIGHT SHOULDER: Primary | ICD-10-CM

## 2024-01-30 DIAGNOSIS — M12.811 ROTATOR CUFF TEAR ARTHROPATHY OF RIGHT SHOULDER: Primary | ICD-10-CM

## 2024-01-30 PROCEDURE — 97140 MANUAL THERAPY 1/> REGIONS: CPT | Performed by: PHYSICAL THERAPIST

## 2024-01-30 PROCEDURE — 97110 THERAPEUTIC EXERCISES: CPT | Performed by: PHYSICAL THERAPIST

## 2024-01-30 NOTE — PROGRESS NOTES
Daily Note     Today's date: 2024  Patient name: Liana Dsouza  : 1950  MRN: [id number]  Referring provider: Brent Alvarado*  Dx:   Encounter Diagnosis     ICD-10-CM    1. Rotator cuff tear arthropathy of right shoulder  M75.101     M12.811                      Subjective: Pt reports continued improvement       Objective: See treatment diary below      Assessment: Tolerated treatment well. Patient demonstrated fatigue post treatment, would benefit from continued PT, and pt remains limited in ER ROM       Plan: Continue per plan of care.  Progress treatment as tolerated.       Precautions: N/A        Manuals    PROM [facility]   [facility] mobs [facility]                                  Neuro Re-Ed                                                                                                                     Ther Ex             Prone row/ext, S/L ER, pec stretch 25'            Arm bike   4/4 4/4 4/4 5/5 5/5 5/5 5/5 5/5   Prone row/ext  1# 2x15 2# 3x15 2# 3x15 2# 3x15 3#, 2# 3x15 DC      Prone Horiz Abd        2x10 3x10    S/L er  3x15 3x15 full ROM 1# 2x15 1# 2x15 2# 3x10 2# 3x10 2# 3x10 2# 3x10    SA punch   3x15 2#  3x15 2# 3# 3x15 3# 3x15 3# 3x15 5# 3x10    S/L flex    3x15  3x15   1# 3x10 2# 3x10    HEP    S/L flex, wall push up  Rows & Ext Green TB 3x10, IR 2x10  Blue tB given     TB IR        Yellow 2x10 3# 3x10 IR/ER YTB 3x10   Std flex/abd       1# 2x10  2# 3x10 ea 2# 3x10 ea And scap 2# 3x10 ea   Rows, Ext [place]        16#, 8# 3x10 ea 18#, 9# 3x10 18#, 9# 4x10   Ther Activity                                       Gait Training                                       Modalities

## 2024-02-06 ENCOUNTER — OFFICE VISIT (OUTPATIENT)
Dept: PHYSICAL THERAPY | Age: 74
End: 2024-02-06
Payer: COMMERCIAL

## 2024-02-06 DIAGNOSIS — M75.101 ROTATOR CUFF TEAR ARTHROPATHY OF RIGHT SHOULDER: Primary | ICD-10-CM

## 2024-02-06 DIAGNOSIS — M12.811 ROTATOR CUFF TEAR ARTHROPATHY OF RIGHT SHOULDER: Primary | ICD-10-CM

## 2024-02-06 PROCEDURE — 97110 THERAPEUTIC EXERCISES: CPT | Performed by: PHYSICAL THERAPIST

## 2024-02-06 NOTE — PROGRESS NOTES
Daily Note     Today's date: 2024  Patient name: Liana Dsouza  : 1950  MRN: [id number]  Referring provider: Brent Alvarado*  Dx:   Encounter Diagnosis     ICD-10-CM    1. Rotator cuff tear arthropathy of right shoulder  M75.101     M12.811                      Subjective: Pt reports some lumbar pain       Objective: See treatment diary below      Assessment: Tolerated treatment well. Patient demonstrated fatigue post treatment, would benefit from continued PT, and pt will consider POC change to address lumbar pain       Plan: Continue per plan of care.  Progress treatment as tolerated.       Precautions: N/A        Manuals    [facility]   [facility] mobs [facility]                                     Neuro Re-Ed                                                                                                                              Ther Ex              Prone row/ext, S/L ER, pec stretch 25'             Arm bike   / 5/5 5/5 5/5 5/5 5/5    Prone row/ext  1# 2x15 2# 3x15 2# 3x15 2# 3x15 3#, 2# 3x15 DC       Prone Horiz Abd        2x10 3x10     S/L er  3x15 3x15 full ROM 1# 2x15 1# 2x15 2# 3x10 2# 3x10 2# 3x10 2# 3x10     SA punch   3x15 2#  3x15 2# 3# 3x15 3# 3x15 3# 3x15 5# 3x10     S/L flex    3x15  3x15   1# 3x10 2# 3x10     HEP    S/L flex, wall push up  Rows & Ext Green TB 3x10, IR 2x10  Blue tB given      TB IR        Yellow 2x10 3# 3x10 IR/ER YTB 3x10 IR/ER YTB 3x10   Std flex/abd       1# 2x10  2# 3x10 ea 2# 3x10 ea And scap 2# 3x10 ea And scap 1# 3x5   Rows, Ext David        16#, 8# 3x10 ea 18#, 9# 3x10 18#, 9# 4x10    Prone lying           Hips to L 10'   HEP review           25'   Ther Activity                                          Gait Training                                          Modalities

## 2024-02-12 DIAGNOSIS — Z00.6 ENCOUNTER FOR EXAMINATION FOR NORMAL COMPARISON OR CONTROL IN CLINICAL RESEARCH PROGRAM: ICD-10-CM

## 2024-02-13 ENCOUNTER — APPOINTMENT (OUTPATIENT)
Dept: PHYSICAL THERAPY | Age: 74
End: 2024-02-13
Payer: COMMERCIAL

## 2024-02-14 ENCOUNTER — OFFICE VISIT (OUTPATIENT)
Dept: PHYSICAL THERAPY | Age: 74
End: 2024-02-14
Payer: COMMERCIAL

## 2024-02-14 DIAGNOSIS — M54.16 LUMBAR RADICULOPATHY: Primary | ICD-10-CM

## 2024-02-14 PROCEDURE — 97110 THERAPEUTIC EXERCISES: CPT | Performed by: PHYSICAL THERAPIST

## 2024-02-14 PROCEDURE — 97164 PT RE-EVAL EST PLAN CARE: CPT | Performed by: PHYSICAL THERAPIST

## 2024-02-14 PROCEDURE — 97140 MANUAL THERAPY 1/> REGIONS: CPT | Performed by: PHYSICAL THERAPIST

## 2024-02-14 NOTE — PROGRESS NOTES
PT Evaluation     Today's date: 2024  Patient name: Liana Dsouza  : 1950  MRN: [id number]  Referring provider: Brent Alvarado*  Dx:   Encounter Diagnosis     ICD-10-CM    1. Lumbar radiculopathy  M54.16                      Assessment  Assessment details: Since reporting to PT with cc of R shoulder pain, pt has made improvements in ROM and strength. At this time, pt has minimal difficulty with ADLs and would like to address lumbar pain. Pt presents with R sided lumbar pain with intermittent pain into R LE. Pt reported decreased pain following sideglide and prone lying, hips to L. Pt would benefit from formal PT to address impairments.   Impairments: abnormal or restricted ROM, activity intolerance, impaired physical strength, lacks appropriate home exercise program and pain with function    Goals  In 4 weeks pt will:  -Be independent with phase I of HEP  -Increase LE strength by 1/2 grade  -Increase Lumber ROM by 25%    By discharge pt will:  -Be independent with Phase II of HEP  -Demonstrate full LE strength  -Demonstrate full Lumbar ROM  -Report minimal pain with ADLs    Plan  Patient would benefit from: skilled physical therapy  Planned therapy interventions: abdominal trunk stabilization, joint mobilization, manual therapy, neuromuscular re-education, patient education, strengthening, stretching, therapeutic activities, therapeutic exercise, home exercise program and functional ROM exercises  Frequency: 2x week  Plan of Care beginning date: 2024  Plan of Care expiration date: 3/27/2024  Treatment plan discussed with: patient and PTA      Subjective    Objective     Active Range of Motion     Right Shoulder   Normal active range of motion    Additional Active Range of Motion Details  Lumbar ROM as % of normal ROM    Flex:75  Ext:50  R ROT:75  L ROT:75  R SB:75  L SB:50    Strength/Myotome Testing     Left Knee   Extension: 4    Right Knee   Extension: 4    Left Ankle/Foot    Dorsiflexion: 4  Plantar flexion: 3+    Right Ankle/Foot   Dorsiflexion: 4  Plantar flexion: 3+           Precautions: N/A      Manuals             PA wesly NORRIS                                                   Neuro Re-Ed                                                                                                        Ther Ex             Prone lying hips to L  2x10' sagittal           Sideglide with pillow HEP            Pallof press                                                                              Ther Activity                                       Gait Training                                       Modalities

## 2024-02-15 ENCOUNTER — APPOINTMENT (OUTPATIENT)
Dept: LAB | Age: 74
End: 2024-02-15

## 2024-02-15 DIAGNOSIS — Z00.6 ENCOUNTER FOR EXAMINATION FOR NORMAL COMPARISON OR CONTROL IN CLINICAL RESEARCH PROGRAM: ICD-10-CM

## 2024-02-15 PROCEDURE — 36415 COLL VENOUS BLD VENIPUNCTURE: CPT

## 2024-02-20 ENCOUNTER — OFFICE VISIT (OUTPATIENT)
Dept: PHYSICAL THERAPY | Age: 74
End: 2024-02-20
Payer: COMMERCIAL

## 2024-02-20 DIAGNOSIS — M54.16 LUMBAR RADICULOPATHY: Primary | ICD-10-CM

## 2024-02-20 PROCEDURE — 97140 MANUAL THERAPY 1/> REGIONS: CPT | Performed by: PHYSICAL THERAPIST

## 2024-02-20 PROCEDURE — 97110 THERAPEUTIC EXERCISES: CPT | Performed by: PHYSICAL THERAPIST

## 2024-02-20 NOTE — PROGRESS NOTES
Daily Note     Today's date: 2024  Patient name: Liana Dsouza  : 1950  MRN: [id number]  Referring provider: Brent Alvarado*  Dx:   Encounter Diagnosis     ICD-10-CM    1. Lumbar radiculopathy  M54.16                      Subjective: No major changes       Objective: See treatment diary below      Assessment: Tolerated treatment well. Patient demonstrated fatigue post treatment, would benefit from continued PT, and pt will continue with prone lying/press ups with hips to L       Plan: Continue per plan of care.  Progress treatment as tolerated.       Precautions: N/A      Manuals             PA wesly NORRIS                                                  Neuro Re-Ed                                                                                                        Ther Ex             Prone lying hips to L  2x10' 3x10' sagittal          PPU hips to   10'           Sideglide with pillow HEP            Pallof press  7# 3x10 ea                                                                            Ther Activity                                       Gait Training                                       Modalities

## 2024-02-27 ENCOUNTER — OFFICE VISIT (OUTPATIENT)
Dept: PHYSICAL THERAPY | Age: 74
End: 2024-02-27
Payer: COMMERCIAL

## 2024-02-27 DIAGNOSIS — M12.811 ROTATOR CUFF TEAR ARTHROPATHY OF RIGHT SHOULDER: ICD-10-CM

## 2024-02-27 DIAGNOSIS — M75.101 ROTATOR CUFF TEAR ARTHROPATHY OF RIGHT SHOULDER: ICD-10-CM

## 2024-02-27 DIAGNOSIS — M54.16 LUMBAR RADICULOPATHY: Primary | ICD-10-CM

## 2024-02-27 PROCEDURE — 97140 MANUAL THERAPY 1/> REGIONS: CPT | Performed by: SPECIALIST/TECHNOLOGIST

## 2024-02-27 PROCEDURE — 97110 THERAPEUTIC EXERCISES: CPT | Performed by: SPECIALIST/TECHNOLOGIST

## 2024-03-05 ENCOUNTER — OFFICE VISIT (OUTPATIENT)
Dept: PHYSICAL THERAPY | Age: 74
End: 2024-03-05
Payer: COMMERCIAL

## 2024-03-05 DIAGNOSIS — M12.811 ROTATOR CUFF TEAR ARTHROPATHY OF RIGHT SHOULDER: ICD-10-CM

## 2024-03-05 DIAGNOSIS — M75.101 ROTATOR CUFF TEAR ARTHROPATHY OF RIGHT SHOULDER: ICD-10-CM

## 2024-03-05 DIAGNOSIS — M54.16 LUMBAR RADICULOPATHY: Primary | ICD-10-CM

## 2024-03-05 PROCEDURE — 97110 THERAPEUTIC EXERCISES: CPT | Performed by: SPECIALIST/TECHNOLOGIST

## 2024-03-05 PROCEDURE — 97140 MANUAL THERAPY 1/> REGIONS: CPT | Performed by: SPECIALIST/TECHNOLOGIST

## 2024-03-05 NOTE — PROGRESS NOTES
Daily Note     Today's date: 3/5/2024  Patient name: Liana Dsouza  : 1950  MRN: [id number]  Referring provider: Brent Alvarado*  Dx:   Encounter Diagnosis     ICD-10-CM    1. Lumbar radiculopathy  M54.16       2. Rotator cuff tear arthropathy of right shoulder  M75.101     M12.811                      Subjective: Pt reports R sided LBP intermittently, somewhat improved but still present.       Objective: See treatment diary below      Assessment: Pt advised to proceed with lumbar extension in sagittal plane, discontinue lateral side glides. Pt tolerated core/pelvis strengthening exercises well this visit. Tolerated treatment well. Patient demonstrated fatigue post treatment, exhibited good technique with therapeutic exercises, and would benefit from continued PT      Plan: Continue per plan of care.  Progress treatment as tolerated.       Precautions: N/A    Manuals  2/20 2/27 3/5         PA mobs JR NORRIS         Lumbar Roll R closing    NORRIS                                   Neuro Re-Ed                                                                                                                     Ther Ex             Treadmill   5' 10'         Prone lying hips to L  2x10' 3x10' 2x10' saggital w OP 2x5'         PPU hips to   10' 10x saggital 20x standing         Sideglide with pillow HEP   20x         Pallof press  7# 3x10 ea 6# 20x ea 7# 20x ea         Press Downs   16# 30x 18# 30x                      Hip ABd    30# 3x10         Leg Press    60# 3x10                      Ther Activity                                       Gait Training                                       Modalities

## 2024-03-12 ENCOUNTER — OFFICE VISIT (OUTPATIENT)
Dept: PHYSICAL THERAPY | Age: 74
End: 2024-03-12
Payer: COMMERCIAL

## 2024-03-12 DIAGNOSIS — M54.16 LUMBAR RADICULOPATHY: Primary | ICD-10-CM

## 2024-03-12 PROCEDURE — 97110 THERAPEUTIC EXERCISES: CPT | Performed by: PHYSICAL THERAPIST

## 2024-03-12 NOTE — PROGRESS NOTES
Daily Note     Today's date: 3/12/2024  Patient name: Liana Dsouza  : 1950  MRN: [id number]  Referring provider: Brent Alvarado*  Dx:   Encounter Diagnosis     ICD-10-CM    1. Lumbar radiculopathy  M54.16                      Subjective: No major changes       Objective: See treatment diary below      Assessment: Tolerated treatment well. Patient demonstrated fatigue post treatment, would benefit from continued PT, and pt will return to sideglide with OP      Plan: Continue per plan of care.  Progress treatment as tolerated.       Precautions: N/A    Manuals  2/20 2/27 3/5 3/12        PA mobs NORRIS NORRIS NORRIS NORRIS NORRIS        Lumbar Roll R closing    NORRIS NORRIS                                  Neuro Re-Ed                                                                                                                     Ther Ex             Treadmill   5' 10' 10'        Prone lying hips to L  2x10' 3x10' 2x10' saggital w OP 2x5'         PPU hips to   10' 10x saggital 20x standing         Sideglide with pillow HEP   20x 2 pillows 2x10        Pallof press  7# 3x10 ea 6# 20x ea 7# 20x ea 7# 20x ea        Press Downs   16# 30x 18# 30x 18# 30x                     Hip ABd    30# 3x10 30# 3x10        Leg Press    60# 3x10 60# 3x10        HS curl     15# 3x10                                  Ther Activity                                       Gait Training                                       Modalities

## 2024-03-19 ENCOUNTER — OFFICE VISIT (OUTPATIENT)
Dept: PHYSICAL THERAPY | Age: 74
End: 2024-03-19
Payer: COMMERCIAL

## 2024-03-19 DIAGNOSIS — M12.811 ROTATOR CUFF TEAR ARTHROPATHY OF RIGHT SHOULDER: ICD-10-CM

## 2024-03-19 DIAGNOSIS — M75.101 ROTATOR CUFF TEAR ARTHROPATHY OF RIGHT SHOULDER: ICD-10-CM

## 2024-03-19 DIAGNOSIS — M54.16 LUMBAR RADICULOPATHY: Primary | ICD-10-CM

## 2024-03-19 PROCEDURE — 97110 THERAPEUTIC EXERCISES: CPT | Performed by: SPECIALIST/TECHNOLOGIST

## 2024-03-19 NOTE — PROGRESS NOTES
Daily Note     Today's date: 3/19/2024  Patient name: Liana Dsouza  : 1950  MRN: [id number]  Referring provider: Brent Alvarado*  Dx:   Encounter Diagnosis     ICD-10-CM    1. Lumbar radiculopathy  M54.16       2. Rotator cuff tear arthropathy of right shoulder  M75.101     M12.811                      Subjective: Pt reports significantly decreased LBP and minimal presence of radiculopathy symptoms between tx sessions.     Objective: See treatment diary below    Assessment: Pt continues to respond favorably to L closing and lumbar extension. Pt able to increase resistance and reps without reproduction of LBP, good TA activation/core stabilization noted.  Tolerated treatment well. Patient demonstrated fatigue post treatment, exhibited good technique with therapeutic exercises, and would benefit from continued PT    Plan: Continue per plan of care.  Progress treatment as tolerated.           Precautions: N/A    Manuals  2/20 2/27 3/5 3/12 3/19       PA mobs JR NORRIS NORRIS NORRIS NORRIS NORRIS       Lumbar Roll R closing    NORRIS NORRIS                                  Neuro Re-Ed                                                                                                                     Ther Ex             Treadmill   5' 10' 10' 10'       Prone lying hips to L  2x10' 3x10' 2x10' saggital w OP 2x5'  2x5' saggital w OP       PPU hips to   10' 10x saggital 20x standing  2x10 saggital       Sideglide with pillow HEP   20x 2 pillows 2x10 2 pillows 3x10       Pallof press  7# 3x10 ea 6# 20x ea 7# 20x ea 7# 20x ea 8# 20x ea       Press Downs   16# 30x 18# 30x 18# 30x 20# 30x                    Hip ABd    30# 3x10 30# 3x10 35# 4x10       Leg Press    60# 3x10 60# 3x10 70# 4x10       HS curl     15# 3x10 25# 4x10       Sled Push      25# 4x50ft                    Ther Activity                                       Gait Training                                       Modalities

## 2024-03-24 LAB
APOB+LDLR+PCSK9 GENE MUT ANL BLD/T: NOT DETECTED
BRCA1+BRCA2 DEL+DUP + FULL MUT ANL BLD/T: NOT DETECTED
MLH1+MSH2+MSH6+PMS2 GN DEL+DUP+FUL M: NOT DETECTED

## 2024-03-26 ENCOUNTER — OFFICE VISIT (OUTPATIENT)
Dept: PHYSICAL THERAPY | Age: 74
End: 2024-03-26
Payer: COMMERCIAL

## 2024-03-26 DIAGNOSIS — M54.16 LUMBAR RADICULOPATHY: Primary | ICD-10-CM

## 2024-03-26 PROCEDURE — 97110 THERAPEUTIC EXERCISES: CPT | Performed by: PHYSICAL THERAPIST

## 2024-04-02 ENCOUNTER — OFFICE VISIT (OUTPATIENT)
Dept: PHYSICAL THERAPY | Age: 74
End: 2024-04-02
Payer: COMMERCIAL

## 2024-04-02 DIAGNOSIS — M54.16 LUMBAR RADICULOPATHY: Primary | ICD-10-CM

## 2024-04-02 DIAGNOSIS — M75.101 ROTATOR CUFF TEAR ARTHROPATHY OF RIGHT SHOULDER: ICD-10-CM

## 2024-04-02 DIAGNOSIS — M12.811 ROTATOR CUFF TEAR ARTHROPATHY OF RIGHT SHOULDER: ICD-10-CM

## 2024-04-02 PROCEDURE — 97110 THERAPEUTIC EXERCISES: CPT | Performed by: SPECIALIST/TECHNOLOGIST

## 2024-04-02 NOTE — PROGRESS NOTES
Daily Note     Today's date: 2024  Patient name: Liana Dsouza  : 1950  MRN: [id number]  Referring provider: Brent Alvarado*  Dx:   Encounter Diagnosis     ICD-10-CM    1. Lumbar radiculopathy  M54.16       2. Rotator cuff tear arthropathy of right shoulder  M75.101     M12.811                      Subjective: Overall radicular symptoms are well controlled, pt does report some lumbar soreness which may have been attributed to her sleeping position.    Objective: See treatment diary below    Assessment: Lumbar ext remains preferential movement pattern for consistent LE radicular symptom centralization. Pt is progressing well with core stabilization program. Tolerated treatment well. Patient demonstrated fatigue post treatment, exhibited good technique with therapeutic exercises, and would benefit from continued PT    Plan: Continue per plan of care.  Progress treatment as tolerated.       Precautions: N/A    Manuals  2/20 2/27 3/5 3/12 3/19 3/26 4/2     PA mobs JR NORRIS NORRIS       Lumbar Roll R closing    JR NORRIS                                  Neuro Re-Ed                                                                                                                     Ther Ex             Treadmill   5' 10' 10' 10' 10' 10'     Prone lying hips to L  2x10' 3x10' 2x10' saggital w OP 2x5'  2x5' saggital w OP 2x5' saggital w OP 2x5' saggital w OP     PPU hips to   10' 10x saggital 20x standing  2x10 saggital 2x10 saggital 3x10 saggital w OP     Sideglide with pillow HEP   20x 2 pillows 2x10 2 pillows 3x10  2 pillows 30x     Pallof press  7# 3x10 ea 6# 20x ea 7# 20x ea 7# 20x ea 8# 20x ea 8# 20x ea 8# 30x     Press Downs   16# 30x 18# 30x 18# 30x 20# 30x 20# 30x 20# 30x                  Hip ABd    30# 3x10 30# 3x10 35# 4x10 35# 4x10 35# 4x10     Leg Press    60# 3x10 60# 3x10 70# 4x10 80# 4x10 80# 4x10     HS curl     15# 3x10 25# 4x10 35# 4x10 35# 4x10     Sled Push      25# 4x50ft 25# 4x50ft  25# 4x50ft                  Ther Activity                                       Gait Training                                       Modalities

## 2024-04-09 ENCOUNTER — APPOINTMENT (OUTPATIENT)
Dept: PHYSICAL THERAPY | Age: 74
End: 2024-04-09
Payer: COMMERCIAL

## 2024-04-11 ENCOUNTER — OFFICE VISIT (OUTPATIENT)
Dept: PHYSICAL THERAPY | Age: 74
End: 2024-04-11
Payer: COMMERCIAL

## 2024-04-11 DIAGNOSIS — M54.16 LUMBAR RADICULOPATHY: Primary | ICD-10-CM

## 2024-04-11 PROCEDURE — 97164 PT RE-EVAL EST PLAN CARE: CPT | Performed by: PHYSICAL THERAPIST

## 2024-04-11 PROCEDURE — 97110 THERAPEUTIC EXERCISES: CPT | Performed by: PHYSICAL THERAPIST

## 2024-04-11 NOTE — PROGRESS NOTES
Daily Note     Today's date: 2024  Patient name: Liana Dsouza  : 1950  MRN: [id number]  Referring provider: Brent Alvarado*  Dx:   Encounter Diagnosis     ICD-10-CM    1. Lumbar radiculopathy  M54.16                      Subjective: Overall improvement since IE      Objective: See treatment diary below      Assessment: Tolerated treatment well. Patient demonstrated fatigue post treatment and at this time, pt will begin with HEP/gym routine and will return if symptoms return.      Plan:  discharge to HEP     Precautions: N/A    Manuals  2/20 2/27 3/5 3/12 3/19 3/26 4/2 4/11    PA mobs NORRIS NORRIS NORRIS NORRIS NORRIS NORRIS       Lumbar Roll R closing    NORRIS NORRIS                                  Neuro Re-Ed                                                                                                                     Ther Ex             Treadmill   5' 10' 10' 10' 10' 10' 10'    Prone lying hips to L  2x10' 3x10' 2x10' saggital w OP 2x5'  2x5' saggital w OP 2x5' saggital w OP 2x5' saggital w OP 2x5' saggital w OP    PPU hips to   10' 10x saggital 20x standing  2x10 saggital 2x10 saggital 3x10 saggital w OP     Sideglide with pillow HEP   20x 2 pillows 2x10 2 pillows 3x10  2 pillows 30x     Pallof press  7# 3x10 ea 6# 20x ea 7# 20x ea 7# 20x ea 8# 20x ea 8# 20x ea 8# 30x 8# 30x    Press Downs   16# 30x 18# 30x 18# 30x 20# 30x 20# 30x 20# 30x                  Hip ABd    30# 3x10 30# 3x10 35# 4x10 35# 4x10 35# 4x10 35# 4x10    Leg Press    60# 3x10 60# 3x10 70# 4x10 80# 4x10 80# 4x10 80# 4x10    HS curl     15# 3x10 25# 4x10 35# 4x10 35# 4x10 35# 4x10    Sled Push      25# 4x50ft 25# 4x50ft 25# 4x50ft                  Ther Activity                                       Gait Training                                       Modalities

## 2024-10-26 ENCOUNTER — TRANSCRIBE ORDERS (OUTPATIENT)
Dept: ADMINISTRATIVE | Age: 74
End: 2024-10-26

## 2024-10-26 ENCOUNTER — APPOINTMENT (OUTPATIENT)
Dept: LAB | Age: 74
End: 2024-10-26
Payer: COMMERCIAL

## 2024-10-26 DIAGNOSIS — I10 ESSENTIAL HYPERTENSION: ICD-10-CM

## 2024-10-26 DIAGNOSIS — E55.9 VITAMIN D DEFICIENCY: ICD-10-CM

## 2024-10-26 DIAGNOSIS — E78.5 HYPERLIPIDEMIA, UNSPECIFIED HYPERLIPIDEMIA TYPE: Primary | ICD-10-CM

## 2024-10-26 DIAGNOSIS — E78.5 HYPERLIPIDEMIA, UNSPECIFIED HYPERLIPIDEMIA TYPE: ICD-10-CM

## 2024-10-26 LAB
25(OH)D3 SERPL-MCNC: 70.5 NG/ML (ref 30–100)
ALBUMIN SERPL BCG-MCNC: 4.3 G/DL (ref 3.5–5)
ALP SERPL-CCNC: 58 U/L (ref 34–104)
ALT SERPL W P-5'-P-CCNC: 12 U/L (ref 7–52)
ANION GAP SERPL CALCULATED.3IONS-SCNC: 4 MMOL/L (ref 4–13)
AST SERPL W P-5'-P-CCNC: 18 U/L (ref 13–39)
BILIRUB SERPL-MCNC: 0.52 MG/DL (ref 0.2–1)
BUN SERPL-MCNC: 17 MG/DL (ref 5–25)
CALCIUM SERPL-MCNC: 9.5 MG/DL (ref 8.4–10.2)
CHLORIDE SERPL-SCNC: 103 MMOL/L (ref 96–108)
CHOLEST SERPL-MCNC: 192 MG/DL
CO2 SERPL-SCNC: 35 MMOL/L (ref 21–32)
CREAT SERPL-MCNC: 0.86 MG/DL (ref 0.6–1.3)
ERYTHROCYTE [DISTWIDTH] IN BLOOD BY AUTOMATED COUNT: 12.9 % (ref 11.6–15.1)
GFR SERPL CREATININE-BSD FRML MDRD: 67 ML/MIN/1.73SQ M
GLUCOSE P FAST SERPL-MCNC: 87 MG/DL (ref 65–99)
HCT VFR BLD AUTO: 43.2 % (ref 34.8–46.1)
HDLC SERPL-MCNC: 82 MG/DL
HGB BLD-MCNC: 14 G/DL (ref 11.5–15.4)
LDLC SERPL CALC-MCNC: 92 MG/DL (ref 0–100)
MCH RBC QN AUTO: 30.2 PG (ref 26.8–34.3)
MCHC RBC AUTO-ENTMCNC: 32.4 G/DL (ref 31.4–37.4)
MCV RBC AUTO: 93 FL (ref 82–98)
NONHDLC SERPL-MCNC: 110 MG/DL
PLATELET # BLD AUTO: 266 THOUSANDS/UL (ref 149–390)
PMV BLD AUTO: 10.3 FL (ref 8.9–12.7)
POTASSIUM SERPL-SCNC: 3.8 MMOL/L (ref 3.5–5.3)
PROT SERPL-MCNC: 7 G/DL (ref 6.4–8.4)
RBC # BLD AUTO: 4.63 MILLION/UL (ref 3.81–5.12)
SODIUM SERPL-SCNC: 142 MMOL/L (ref 135–147)
TRIGL SERPL-MCNC: 90 MG/DL
WBC # BLD AUTO: 3.74 THOUSAND/UL (ref 4.31–10.16)

## 2024-10-26 PROCEDURE — 85027 COMPLETE CBC AUTOMATED: CPT

## 2024-10-26 PROCEDURE — 82306 VITAMIN D 25 HYDROXY: CPT

## 2024-10-26 PROCEDURE — 80053 COMPREHEN METABOLIC PANEL: CPT

## 2024-10-26 PROCEDURE — 36415 COLL VENOUS BLD VENIPUNCTURE: CPT

## 2024-10-26 PROCEDURE — 80061 LIPID PANEL: CPT

## 2024-11-11 ENCOUNTER — ESTABLISHED COMPREHENSIVE EXAM (OUTPATIENT)
Dept: URBAN - METROPOLITAN AREA CLINIC 6 | Facility: CLINIC | Age: 74
End: 2024-11-11

## 2024-11-11 DIAGNOSIS — H25.813: ICD-10-CM

## 2024-11-11 DIAGNOSIS — H35.363: ICD-10-CM

## 2024-11-11 DIAGNOSIS — H04.123: ICD-10-CM

## 2024-11-11 PROCEDURE — 92014 COMPRE OPH EXAM EST PT 1/>: CPT

## 2024-11-11 PROCEDURE — 92015 DETERMINE REFRACTIVE STATE: CPT

## 2024-11-11 PROCEDURE — 92134 CPTRZ OPH DX IMG PST SGM RTA: CPT

## 2024-11-11 ASSESSMENT — VISUAL ACUITY
OD_PH: 20/40
OS_PH: 20/30+1
OD_CC: 20/50
OS_CC: 20/50

## 2024-11-11 ASSESSMENT — KERATOMETRY
OS_K2POWER_DIOPTERS: 45.00
OD_AXISANGLE_DEGREES: 180
OD_K1POWER_DIOPTERS: 44.75
OS_AXISANGLE2_DEGREES: 161
OD_AXISANGLE2_DEGREES: 90
OS_K1POWER_DIOPTERS: 44.75
OS_AXISANGLE_DEGREES: 071
OD_K2POWER_DIOPTERS: 44.75

## 2024-11-11 ASSESSMENT — TONOMETRY
OD_IOP_MMHG: 19
OS_IOP_MMHG: 17

## (undated) RX ORDER — ATROPINE SULFATE 10 MG/ML
1 SOLUTION/ DROPS OPHTHALMIC
Start: 2021-08-19

## (undated) RX ORDER — DUREZOL 0.5 MG/ML
1 EMULSION OPHTHALMIC
Start: 2021-08-19

## (undated) RX ORDER — LOTEPREDNOL ETABONATE 5 MG/ML: 1 SUSPENSION/ DROPS OPHTHALMIC

## (undated) RX ORDER — SODIUM CHLORIDE 50 MG/ML
1 SOLUTION OPHTHALMIC
Start: 2021-08-19